# Patient Record
Sex: MALE | URBAN - METROPOLITAN AREA
[De-identification: names, ages, dates, MRNs, and addresses within clinical notes are randomized per-mention and may not be internally consistent; named-entity substitution may affect disease eponyms.]

---

## 2024-09-27 ENCOUNTER — APPOINTMENT (OUTPATIENT)
Dept: PRIMARY CARE | Facility: CLINIC | Age: 54
End: 2024-09-27

## 2024-10-11 ENCOUNTER — APPOINTMENT (OUTPATIENT)
Dept: PRIMARY CARE | Facility: CLINIC | Age: 54
End: 2024-10-11

## 2024-11-30 ENCOUNTER — APPOINTMENT (OUTPATIENT)
Dept: PRIMARY CARE | Facility: CLINIC | Age: 54
End: 2024-11-30
Payer: COMMERCIAL

## 2024-11-30 VITALS
OXYGEN SATURATION: 95 % | HEIGHT: 64 IN | BODY MASS INDEX: 25.61 KG/M2 | DIASTOLIC BLOOD PRESSURE: 82 MMHG | TEMPERATURE: 98 F | SYSTOLIC BLOOD PRESSURE: 131 MMHG | WEIGHT: 150 LBS | HEART RATE: 86 BPM

## 2024-11-30 DIAGNOSIS — Z00.00 HEALTHCARE MAINTENANCE: ICD-10-CM

## 2024-11-30 DIAGNOSIS — Z82.49 FAMILY HISTORY OF HEART DISEASE: ICD-10-CM

## 2024-11-30 DIAGNOSIS — E78.5 MILD HYPERLIPIDEMIA: ICD-10-CM

## 2024-11-30 DIAGNOSIS — R79.89 ELEVATED HOMOCYSTEINE: ICD-10-CM

## 2024-11-30 DIAGNOSIS — Z76.89 ENCOUNTER TO ESTABLISH CARE: Primary | ICD-10-CM

## 2024-11-30 DIAGNOSIS — K42.9 UMBILICAL HERNIA WITHOUT OBSTRUCTION AND WITHOUT GANGRENE: ICD-10-CM

## 2024-11-30 DIAGNOSIS — K76.0 FATTY LIVER: ICD-10-CM

## 2024-11-30 DIAGNOSIS — E55.9 VITAMIN D DEFICIENCY: ICD-10-CM

## 2024-11-30 DIAGNOSIS — K82.4 GALLBLADDER POLYP: ICD-10-CM

## 2024-11-30 PROBLEM — J36 PERITONSILLAR ABSCESS: Status: RESOLVED | Noted: 2024-09-27 | Resolved: 2024-11-30

## 2024-11-30 ASSESSMENT — ENCOUNTER SYMPTOMS
CHILLS: 0
MYALGIAS: 0
ARTHRALGIAS: 0
FATIGUE: 0
SORE THROAT: 0
COUGH: 0
NUMBNESS: 0
DIZZINESS: 0
CONSTIPATION: 0
FREQUENCY: 0
LIGHT-HEADEDNESS: 0
NAUSEA: 0
PALPITATIONS: 0
DYSPHORIC MOOD: 0
VOMITING: 0
DIARRHEA: 0
SHORTNESS OF BREATH: 0
FEVER: 0
DYSURIA: 0
COLOR CHANGE: 0
NERVOUS/ANXIOUS: 0
ABDOMINAL PAIN: 0
RHINORRHEA: 0

## 2024-11-30 NOTE — PATIENT INSTRUCTIONS
Thank you for coming in and getting established with me today.    We did review some of your history especially her family history of heart disease.  I did order lab work to be done fasting.  Please make sure that you are fasting for 10 hours beforehand.  The lab is located downstairs on the first floor open on weekdays only from about 6:30 AM to about 4:30 PM.  It can also be done and [facility].  Once you get the labs done, you should hear back from us within a week.  Please call us if you have not heard from our office in regards to the results.    With your family history, we did perform an EKG here in the office.  Also submitting an order for stress test.  We also discussed and I did provide a referral to cardiology at your request.  Please call the next 1 to 2 weeks if you do not hear from them about getting set up for an appointment.    We did review some of the testing that you had done with an ultrasound that you had performed in [facility] in June.  It did show a fatty liver as well as gallbladder polyps.  We may repeat an ultrasound next year unless we have abnormalities of the lab work.    Please keep up the good work with overall healthy diet.  I do recommend working on exercise including increasing cardiovascular activity as well as regular strength training.  I recommend training and exercising for a goal above 280 minutes spread out over the course of the week.  Please start slow and slowly work your way up to this to avoid any sort of injury.  Excellent work with avoiding any sort of tobacco, alcohol or drug use.    I would like you to try to get the results faxed from your previous Cologuard testing that you had done at the [facility] with your last provider 2 years ago.  You would be due next year in 2025 anyways.  I do recommend an annual flu vaccine.  Please have any immunization records faxed from your previous primary care doctor specifically in regards to your tetanus  immunization.  It would be recommended that you get the updated shingles vaccine.    We can continue with regular follow-up.  Please get set up for a wellness exam with me next year.  We can follow-up sooner after 6 months depending on the results of lab work if you would like.    Thank you

## 2024-11-30 NOTE — ASSESSMENT & PLAN NOTE
Father and mother  of heart attacks in their 60s.  Sister  of a heart attack in her early 60s.  Brother recently  of heart attack at 58.

## 2024-11-30 NOTE — ASSESSMENT & PLAN NOTE
Diagnosed on Ultrasound in [place] on 06/13/2024. Containing omentum. No current symptoms. Defers referral to general surgery for now.

## 2024-11-30 NOTE — PROGRESS NOTES
"Subjective   Patient ID: Katiuska Bryan is a 54 y.o. male who presents for Establish Care.    HPI     No acute concerns or complaints today  He has a family history of heart attacks. Both of his parents and his brother and sister. His brother was 58 his     Dental: Regular dental exams at least once a year.  Vision: Wears reading glasses. Yearly eye exams.    Diet: Well balanced. Could be better in some ways. Doing intermittent fasting a few times a month.  Exercise: 1-2 times a week exercise. Will do walking for about 30 minutes. Has a treadmill at home.  Caffeine: 1 cup of coffee.  Fluids: Well hydrated.  Supplements: Multivitamin    Tobacco: None. Never.  Alcohol: None.  Recreational Drugs: None    Last Colonoscopy: Had performed Cologuard testing about 2 years ago.  Low Dose Lung CT Screening: Not indicated   AAA Screening: Not indicated.    Influenza: Recommended annually.  Tetanus: Thinks he got 1-2 years ago.  COVID: Recommended   Shingles: Recommended. Thinks he has gotten in the past.    Review of Systems   Constitutional:  Negative for chills, fatigue and fever.   HENT:  Negative for congestion, rhinorrhea and sore throat.    Eyes:  Negative for visual disturbance.   Respiratory:  Negative for cough and shortness of breath.    Cardiovascular:  Negative for chest pain and palpitations.   Gastrointestinal:  Negative for abdominal pain, constipation, diarrhea, nausea and vomiting.   Genitourinary:  Negative for dysuria and frequency.   Musculoskeletal:  Negative for arthralgias and myalgias.   Skin:  Negative for color change and rash.   Neurological:  Negative for dizziness, light-headedness and numbness.   Psychiatric/Behavioral:  Negative for dysphoric mood. The patient is not nervous/anxious.        Objective   /82   Pulse 86   Temp 36.7 °C (98 °F)   Ht 1.626 m (5' 4\")   Wt 68 kg (150 lb)   SpO2 95%   BMI 25.75 kg/m²   BSA Body surface area is 1.75 meters squared.      Physical " Exam  Vitals and nursing note reviewed.   Constitutional:       General: He is not in acute distress.     Appearance: Normal appearance. He is normal weight. He is not ill-appearing or toxic-appearing.   HENT:      Head: Normocephalic and atraumatic.      Right Ear: Tympanic membrane, ear canal and external ear normal.      Left Ear: Tympanic membrane, ear canal and external ear normal.      Nose: Nose normal.      Mouth/Throat:      Mouth: Mucous membranes are moist.      Pharynx: Oropharynx is clear.   Eyes:      Extraocular Movements: Extraocular movements intact.      Conjunctiva/sclera: Conjunctivae normal.      Pupils: Pupils are equal, round, and reactive to light.   Cardiovascular:      Rate and Rhythm: Normal rate and regular rhythm.      Pulses: Normal pulses.      Heart sounds: Normal heart sounds.   Pulmonary:      Effort: Pulmonary effort is normal.      Breath sounds: Normal breath sounds.   Abdominal:      General: Abdomen is flat. Bowel sounds are normal.      Palpations: Abdomen is soft.   Musculoskeletal:         General: Normal range of motion.      Cervical back: Normal range of motion and neck supple.   Skin:     General: Skin is warm and dry.   Neurological:      General: No focal deficit present.      Mental Status: He is alert and oriented to person, place, and time. Mental status is at baseline.      Cranial Nerves: No cranial nerve deficit.      Sensory: No sensory deficit.      Motor: No weakness.   Psychiatric:         Mood and Affect: Mood normal.         Behavior: Behavior normal.         Thought Content: Thought content normal.         Judgment: Judgment normal.       No visits with results within 1 Year(s) from this visit.   Latest known visit with results is:   No results found for any previous visit.     No current outpatient medications on file prior to visit.     No current facility-administered medications on file prior to visit.     No images are attached to the encounter.         Assessment/Plan   Problem List Items Addressed This Visit             ICD-10-CM    Mild hyperlipidemia E78.5    Relevant Orders    CBC and Auto Differential    Comprehensive Metabolic Panel    Lipid Panel    TSH with reflex to Free T4 if abnormal    Stress Test    Referral to Cardiology    Vitamin D deficiency E55.9    Relevant Orders    Vitamin D 25-Hydroxy,Total (for eval of Vitamin D levels)    Elevated homocysteine R79.89    Relevant Orders    Folate    Vitamin B12    Homocysteine, serum    Methylmalonic acid, serum    Referral to Cardiology    Family history of heart disease Z82.49     Father and mother  of heart attacks in their 60s.  Sister  of a heart attack in her early 60s.  Brother recently  of heart attack at 58.         Relevant Orders    Folate    Vitamin B12    Homocysteine, serum    Methylmalonic acid, serum    ECG 12 lead (Clinic Performed)    Stress Test    Referral to Cardiology    Fatty liver K76.0     Diagnosed on Ultrasound in [place] on 2024.         Relevant Orders    Comprehensive Metabolic Panel    Lipid Panel    Gallbladder polyp K82.4     Diagnosed on Ultrasound in [facility] on 2024. Largest 4-5mm. Does not remember if they recommended follow up.         Relevant Orders    Comprehensive Metabolic Panel    Lipid Panel    Umbilical hernia without obstruction and without gangrene K42.9     Diagnosed on Ultrasound in [facility] on 2024. Containing omentum. No current symptoms. Defers referral to general surgery for now.          Other Visit Diagnoses         Codes    Encounter to establish care    -  Primary Z76.89    Relevant Orders    Prostate Specific Antigen    Stress Test    Referral to Cardiology    Healthcare maintenance     Z00.00    Relevant Orders    CBC and Auto Differential    Comprehensive Metabolic Panel    Lipid Panel    Prostate Specific Antigen    TSH with reflex to Free T4 if abnormal    Vitamin D 25-Hydroxy,Total (for eval of Vitamin D levels)           History and physical examination as above.  Patient presenting to [facility] care.  No acute concerns or complaints today.  Significant family history for heart disease in both parents as well as his brother and sister.  His brother recently  of a heart attack at the age of 58.  Patient states and denies any alcohol or tobacco use.  CT cardiac score performed on the [facility] in 2024 was a score of 0.  EKG ordered and performed today without any acute findings.  We will submit for stress testing for the patient with significant family history.  Patient is also interested in seeing a cardiologist and a referral order was placed.  Screening lab work ordered for the patient to be done fasting.  We will call and follow-up based on results.  Patient will call his previous primary care provider to get the results of his last Cologuard testing as well as immunization records.  Discussed other aspects of health including healthy diet and exercise, regular dental and vision care, caffeine use, hydration, supplementation, substance use, screening test and immunizations.  Recommendations given as documented.  Plan for follow-up in 6 months.  He will come in in 1 year for a follow-up wellness examination.

## 2024-11-30 NOTE — ASSESSMENT & PLAN NOTE
Diagnosed on Ultrasound in [place] on 06/13/2024. Largest 4-5mm. Does not remember if they recommended follow up.

## 2024-12-04 ENCOUNTER — LAB (OUTPATIENT)
Dept: LAB | Facility: LAB | Age: 54
End: 2024-12-04
Payer: COMMERCIAL

## 2024-12-04 DIAGNOSIS — E55.9 VITAMIN D DEFICIENCY: ICD-10-CM

## 2024-12-04 DIAGNOSIS — K82.4 GALLBLADDER POLYP: ICD-10-CM

## 2024-12-04 DIAGNOSIS — K76.0 FATTY LIVER: ICD-10-CM

## 2024-12-04 DIAGNOSIS — Z82.49 FAMILY HISTORY OF HEART DISEASE: ICD-10-CM

## 2024-12-04 DIAGNOSIS — Z00.00 HEALTHCARE MAINTENANCE: ICD-10-CM

## 2024-12-04 DIAGNOSIS — R79.89 ELEVATED HOMOCYSTEINE: ICD-10-CM

## 2024-12-04 DIAGNOSIS — E78.5 MILD HYPERLIPIDEMIA: ICD-10-CM

## 2024-12-04 DIAGNOSIS — Z76.89 ENCOUNTER TO ESTABLISH CARE: ICD-10-CM

## 2024-12-04 PROCEDURE — 83921 ORGANIC ACID SINGLE QUANT: CPT

## 2024-12-04 PROCEDURE — 84153 ASSAY OF PSA TOTAL: CPT

## 2024-12-04 PROCEDURE — 80053 COMPREHEN METABOLIC PANEL: CPT

## 2024-12-04 PROCEDURE — 83090 ASSAY OF HOMOCYSTEINE: CPT

## 2024-12-04 PROCEDURE — 82306 VITAMIN D 25 HYDROXY: CPT

## 2024-12-04 PROCEDURE — 84443 ASSAY THYROID STIM HORMONE: CPT

## 2024-12-04 PROCEDURE — 83036 HEMOGLOBIN GLYCOSYLATED A1C: CPT

## 2024-12-04 PROCEDURE — 82746 ASSAY OF FOLIC ACID SERUM: CPT

## 2024-12-04 PROCEDURE — 36415 COLL VENOUS BLD VENIPUNCTURE: CPT

## 2024-12-04 PROCEDURE — 80061 LIPID PANEL: CPT

## 2024-12-04 PROCEDURE — 85025 COMPLETE CBC W/AUTO DIFF WBC: CPT

## 2024-12-04 PROCEDURE — 82607 VITAMIN B-12: CPT

## 2024-12-05 LAB
25(OH)D3 SERPL-MCNC: 39 NG/ML (ref 30–100)
ALBUMIN SERPL BCP-MCNC: 4.7 G/DL (ref 3.4–5)
ALP SERPL-CCNC: 76 U/L (ref 33–120)
ALT SERPL W P-5'-P-CCNC: 25 U/L (ref 10–52)
ANION GAP SERPL CALC-SCNC: 14 MMOL/L (ref 10–20)
AST SERPL W P-5'-P-CCNC: 20 U/L (ref 9–39)
BASOPHILS # BLD AUTO: 0.04 X10*3/UL (ref 0–0.1)
BASOPHILS NFR BLD AUTO: 0.5 %
BILIRUB SERPL-MCNC: 0.5 MG/DL (ref 0–1.2)
BUN SERPL-MCNC: 14 MG/DL (ref 6–23)
CALCIUM SERPL-MCNC: 9.4 MG/DL (ref 8.6–10.6)
CHLORIDE SERPL-SCNC: 105 MMOL/L (ref 98–107)
CHOLEST SERPL-MCNC: 194 MG/DL (ref 0–199)
CHOLESTEROL/HDL RATIO: 4.3
CO2 SERPL-SCNC: 27 MMOL/L (ref 21–32)
CREAT SERPL-MCNC: 0.95 MG/DL (ref 0.5–1.3)
EGFRCR SERPLBLD CKD-EPI 2021: >90 ML/MIN/1.73M*2
EOSINOPHIL # BLD AUTO: 0.16 X10*3/UL (ref 0–0.7)
EOSINOPHIL NFR BLD AUTO: 2 %
ERYTHROCYTE [DISTWIDTH] IN BLOOD BY AUTOMATED COUNT: 12.5 % (ref 11.5–14.5)
EST. AVERAGE GLUCOSE BLD GHB EST-MCNC: 103 MG/DL
FOLATE SERPL-MCNC: 9.6 NG/ML
GLUCOSE SERPL-MCNC: 79 MG/DL (ref 74–99)
HBA1C MFR BLD: 5.2 %
HCT VFR BLD AUTO: 46.5 % (ref 41–52)
HCYS SERPL-SCNC: 27.88 UMOL/L (ref 5–13.9)
HDLC SERPL-MCNC: 45.6 MG/DL
HGB BLD-MCNC: 15.5 G/DL (ref 13.5–17.5)
IMM GRANULOCYTES # BLD AUTO: 0.03 X10*3/UL (ref 0–0.7)
IMM GRANULOCYTES NFR BLD AUTO: 0.4 % (ref 0–0.9)
LDLC SERPL CALC-MCNC: 131 MG/DL
LYMPHOCYTES # BLD AUTO: 2.02 X10*3/UL (ref 1.2–4.8)
LYMPHOCYTES NFR BLD AUTO: 25.1 %
MCH RBC QN AUTO: 28 PG (ref 26–34)
MCHC RBC AUTO-ENTMCNC: 33.3 G/DL (ref 32–36)
MCV RBC AUTO: 84 FL (ref 80–100)
MONOCYTES # BLD AUTO: 0.52 X10*3/UL (ref 0.1–1)
MONOCYTES NFR BLD AUTO: 6.5 %
NEUTROPHILS # BLD AUTO: 5.29 X10*3/UL (ref 1.2–7.7)
NEUTROPHILS NFR BLD AUTO: 65.5 %
NON HDL CHOLESTEROL: 148 MG/DL (ref 0–149)
NRBC BLD-RTO: 0 /100 WBCS (ref 0–0)
PLATELET # BLD AUTO: 334 X10*3/UL (ref 150–450)
POTASSIUM SERPL-SCNC: 4 MMOL/L (ref 3.5–5.3)
PROT SERPL-MCNC: 7.2 G/DL (ref 6.4–8.2)
PSA SERPL-MCNC: 0.77 NG/ML
RBC # BLD AUTO: 5.53 X10*6/UL (ref 4.5–5.9)
SODIUM SERPL-SCNC: 142 MMOL/L (ref 136–145)
TRIGL SERPL-MCNC: 89 MG/DL (ref 0–149)
TSH SERPL-ACNC: 0.93 MIU/L (ref 0.44–3.98)
VIT B12 SERPL-MCNC: 311 PG/ML (ref 211–911)
VLDL: 18 MG/DL (ref 0–40)
WBC # BLD AUTO: 8.1 X10*3/UL (ref 4.4–11.3)

## 2024-12-07 LAB — METHYLMALONATE SERPL-SCNC: 0.33 UMOL/L (ref 0–0.4)

## 2025-01-08 ENCOUNTER — TELEPHONE (OUTPATIENT)
Dept: PRIMARY CARE | Facility: CLINIC | Age: 55
End: 2025-01-08
Payer: COMMERCIAL

## 2025-01-08 NOTE — TELEPHONE ENCOUNTER
Pt called inquiring if we could sched. His stress test however it looks like his cardiologist schedules this please advise...     also pt was asking about a calcium score to be ordered please advise...    Pt phone: [phone]

## 2025-01-23 ENCOUNTER — HOSPITAL ENCOUNTER (OUTPATIENT)
Dept: CARDIOLOGY | Facility: CLINIC | Age: 55
Discharge: HOME | End: 2025-01-23
Payer: COMMERCIAL

## 2025-01-23 DIAGNOSIS — E78.5 MILD HYPERLIPIDEMIA: ICD-10-CM

## 2025-01-23 DIAGNOSIS — Z82.49 FAMILY HISTORY OF HEART DISEASE: ICD-10-CM

## 2025-01-23 DIAGNOSIS — Z76.89 ENCOUNTER TO ESTABLISH CARE: ICD-10-CM

## 2025-01-23 PROCEDURE — 93018 CV STRESS TEST I&R ONLY: CPT | Performed by: STUDENT IN AN ORGANIZED HEALTH CARE EDUCATION/TRAINING PROGRAM

## 2025-01-23 PROCEDURE — 93016 CV STRESS TEST SUPVJ ONLY: CPT | Performed by: STUDENT IN AN ORGANIZED HEALTH CARE EDUCATION/TRAINING PROGRAM

## 2025-01-23 PROCEDURE — 93017 CV STRESS TEST TRACING ONLY: CPT

## 2025-05-30 ENCOUNTER — APPOINTMENT (OUTPATIENT)
Dept: PRIMARY CARE | Facility: CLINIC | Age: 55
End: 2025-05-30
Payer: COMMERCIAL

## 2025-11-07 ENCOUNTER — APPOINTMENT (OUTPATIENT)
Dept: PRIMARY CARE | Facility: CLINIC | Age: 55
End: 2025-11-07
Payer: COMMERCIAL

## 2025-11-28 ENCOUNTER — APPOINTMENT (OUTPATIENT)
Dept: PRIMARY CARE | Facility: CLINIC | Age: 55
End: 2025-11-28
Payer: COMMERCIAL